# Patient Record
(demographics unavailable — no encounter records)

---

## 2025-06-12 NOTE — PHYSICAL EXAM
[TextEntry] : General: NAD HEENT: NC/AT, EOMI, PERRLA, pharynx moist and pink, no exudate. Neck: supple, no JVD. CVS: S1, S2 normal, RRR, no m/g/r Resp: CTA b/l, no wheeze/rale/rhonchi Abdomen: soft, NT/ND, positive bowel sounds. no HSM. Extremities: no edema, peripheral pulses 2+ bilaterally.  Neuro: aaox3, 5/5 motor strength in all 4 extremities. normal sensation, normal gait.  Psych: normal affect, no SI/HI.

## 2025-06-12 NOTE — HEALTH RISK ASSESSMENT
[Yes] : Yes [Monthly or less (1 pt)] : Monthly or less (1 point) [1 or 2 (0 pts)] : 1 or 2 (0 points) [Never (0 pts)] : Never (0 points) [No] : In the past 12 months have you used drugs other than those required for medical reasons? No [0] : 2) Feeling down, depressed, or hopeless: Not at all (0) [Never] : Never [PHQ-2 Negative - No further assessment needed] : PHQ-2 Negative - No further assessment needed [I have developed a follow-up plan documented below in the note.] : I have developed a follow-up plan documented below in the note. [With Family] : lives with family [Employed] : employed [] :  [Audit-CScore] : 1 [[id number]] : 0 [EyeExamDate] : 2024 [FreeTextEntry2] : Works at [employer]

## 2025-06-12 NOTE — ASSESSMENT
[Vaccines Reviewed] : Immunizations reviewed today. Please see immunization details in the vaccine log within the immunization flowsheet.  [FreeTextEntry1] : 1. Health Maintenance: Patient counseled regarding recommendations for vaccines, seat belt safety, distracted driving, diet and exercise and all preventative screening. Patient will follow up with GI for colonoscopy, referral given. Discussed blood work to obtain.  2. prediabetes: Recheck hemoglobin A1c. Pt advised to keep diabetic diet, decrease carbs and increase dietary protein intake. Exercise as tolerated 3-4 times a week.  3.hyperlipidemia: Recheck lipid panel. Patient advised on low cholesterol diet-decrease in white carbs and exercise 150 minutes per week.

## 2025-07-03 NOTE — ASSESSMENT
[FreeTextEntry1] : 1.type 2 diabetes mellitus: Discussed diagnosis in great depth, advised patient able to stay off medication at this time and recheck hemoglobin A1c in 3 to 6 months. Pt advised to keep diabetic diet, decrease carbs and increase dietary protein intake. Exercise as tolerated 3-4 times a week.

## 2025-07-03 NOTE — HISTORY OF PRESENT ILLNESS
[Home] : at home, [unfilled] , at the time of the visit. [Medical Office: ([facility])___] : at the medical office located in  [Telephone (audio)] : This telephonic visit was provided via audio only technology. [Patient preference] : patient preference. [Verbal consent obtained from patient] : the patient, [unfilled] [FreeTextEntry1] :  Follow Up Visit   [de-identified] : ROSALINA BECERRA is a 50 year M who calls in for a follow up visit. Discussed with patient recent lab results including borderline high cholesterol with ASCVD risk score at 7.98%.  Also discussed hemoglobin A1c now in diabetic range at 6.6.  Discussed new diagnosis of type 2 diabetes and screening that would be needed including regular ophthalmology and podiatry visits regular checks of his glucose. All questions answered.